# Patient Record
Sex: MALE | Race: OTHER | Employment: UNEMPLOYED | URBAN - METROPOLITAN AREA
[De-identification: names, ages, dates, MRNs, and addresses within clinical notes are randomized per-mention and may not be internally consistent; named-entity substitution may affect disease eponyms.]

---
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
## 2019-01-07 NOTE — TELEPHONE ENCOUNTER
Patient call  Trying  To updated  A accessnow but  Patient havent seen a doctor  Lately . I told patient to come to Sherwin Aleman for a follow up if he need it .     Marshall Becerra

## 2019-07-08 ENCOUNTER — TELEPHONE (OUTPATIENT)
Dept: FAMILY MEDICINE CLINIC | Age: 19
End: 2019-07-08

## 2019-07-08 NOTE — TELEPHONE ENCOUNTER
Faxed refill request for Furosemide 40 mg tab take 1/2 tab daily. Chart reviewed. LOV was 3/7/18 w/ provider Alley Miranda NP. Refill denied and faxed back to pharmacy w/ note that pt needs to return for f/u. Reached out to pt and phone answered by sister, Sylvia Zavala. Not on HIPA form. Cannot discuss issues as saiter is not on HIPA and just gave general information that any pt needs to return for  refills. Line starts prior to clinic start time.

## 2019-11-16 ENCOUNTER — OFFICE VISIT (OUTPATIENT)
Dept: FAMILY MEDICINE CLINIC | Age: 19
End: 2019-11-16

## 2019-11-16 VITALS
HEART RATE: 50 BPM | WEIGHT: 166.2 LBS | DIASTOLIC BLOOD PRESSURE: 63 MMHG | OXYGEN SATURATION: 99 % | TEMPERATURE: 98.9 F | HEIGHT: 64 IN | SYSTOLIC BLOOD PRESSURE: 115 MMHG | BODY MASS INDEX: 28.38 KG/M2

## 2019-11-16 DIAGNOSIS — N04.9 NEPHROTIC SYNDROME: Primary | ICD-10-CM

## 2019-11-16 DIAGNOSIS — Z71.89 COUNSELING AND COORDINATION OF CARE: Primary | ICD-10-CM

## 2019-11-16 DIAGNOSIS — Z23 ENCOUNTER FOR IMMUNIZATION: ICD-10-CM

## 2019-11-16 RX ORDER — FUROSEMIDE 40 MG/1
20 TABLET ORAL DAILY
Qty: 30 TAB | Refills: 0 | Status: SHIPPED | OUTPATIENT
Start: 2019-11-16

## 2019-11-16 NOTE — PROGRESS NOTES
Assessment/Plan:       ICD-10-CM ICD-9-CM    1. Nephrotic syndrome N04.9 581.9 furosemide (LASIX) 40 mg tablet      REFERRAL TO NEPHROLOGY   2. Encounter for immunization Z23 V03.89 Shannan Doll [address], [address] [facility] [address]  [address] [facility] [place] [address]  Phone: [phone] Fax: [phone]    Jenise Julio [id number] [facility]  [address]  [address] [facility] [address]  Phone: [phone] Fax: [phone]      [facility]  Subjective:     Chief Complaint   Patient presents with    Medication Refill     medication refill for renal insuffiency     Immunization/Injection    Saranya Nava is a 20 y.o. OTHER male. HPI:   He  has no past medical history of Abdominal colic, Anemia, Asthma, Autism, Community acquired pneumonia, Developmental delay, Irritable bowel syndrome, Murmur, Obesity, Otitis media, Overbite, Premature infant, Psychiatric problem, Reactive airway disease, STD (sexually transmitted disease), Thyroid activity decreased, or Vision decreased. Hasn't seen the kidney specialist in a while. Review of Systems: Negative for: fever, chest pain, shortness of breath, leg swelling, exertional dyspnea, palpitations. Current Medications:   No current outpatient medications on file prior to visit. No current facility-administered medications on file prior to visit. Social History: He  reports that he has never smoked. He has never used smokeless tobacco. He reports that he drinks alcohol. He reports that he does not use drugs. Objective:     Vitals:    11/16/19 1100   BP: 115/63   Pulse: (!) 50   Temp: 98.9 °F (37.2 °C)   TempSrc: Oral   SpO2: 99%   Weight: 166 lb 3.2 oz (75.4 kg)   Height: 5' 4.02\" (1.626 m)    No LMP for male patient.    Wt Readings from Last 2 Encounters:   11/16/19 166 lb 3.2 oz (75.4 kg)   03/07/18 207 lb (93.9 kg) (95 %, Z= 1.69)*     * Growth percentiles are based on [facility] (Boys, 2-20 Years) data. Physical Examination:  General appearance - well developed, no acute distress. Chest - clear to auscultation. Heart - regular rate and rhythm without murmurs, rubs, or gallops. Abdomen - bowel sounds present x 4, NT, ND. Extremities - pulses intact. No peripheral edema. Assessment/Plan:   Diagnoses and all orders for this visit:    1. Nephrotic syndrome  -     furosemide (LASIX) 40 mg tablet; Take 0.5 Tabs by mouth daily.  -     REFERRAL TO NEPHROLOGY; Future    2. Encounter for immunization  -     INFLUENZA VIRUS VAC QUAD,SPLIT,PRESV FREE SYRINGE IM        Zeeshan Heller, JAMES, FNP-BC, BC-ADM  [address] [place] [address] expressed understanding of this plan.

## 2019-11-16 NOTE — PROGRESS NOTES
Patient seen for discharge with assistance from [facility], registrar and . We reviewed the AVS, prescription, pharmacy location and the referral to AN for Nephrology The patient will go now to registration to schedule a financial screening appointment. Luca Martin RN       [facility] Samir Barry. Patient denied fever and tolerated vaccine well. Patient verbalized understanding of possible side effects from vaccine and when to seek emergency medical treatment. VIIS information sheet given to patient. Patient had no adverse reaction at time of discharge.

## 2019-11-18 ENCOUNTER — DOCUMENTATION ONLY (OUTPATIENT)
Dept: FAMILY MEDICINE CLINIC | Age: 19
End: 2019-11-18

## 2019-11-18 DIAGNOSIS — Z20.2 EXPOSURE TO CHLAMYDIA: Primary | ICD-10-CM

## 2019-11-18 DIAGNOSIS — N04.9 NEPHROTIC SYNDROME: ICD-10-CM

## 2019-11-18 NOTE — PROGRESS NOTES
Venu Quiroga will return for lab appointment on 11/20/19 at [facility] for the following:

## 2019-11-18 NOTE — PROGRESS NOTES
Telephone call to Danielle Flowers who is listed on Jack's permission to release all information form. Discussed that prior to giving him treatment, I wanted to measure his renal function. I also wanted to check him for gonorrhea and chlamydia. My plan is to treat with azithromycin 250 mg, #4 tab and send in to his pharmacy. His last renal function over a year ago showed normal GFR. Per UpToDate, if GFR is < 10 this should be \"used with caution\". There is no recommendation on a dosage adjustment, however. I was able to offer June Hopper a lab appointment for Wednesday at [facility], which Tila Puente put into [place]. No questions at close of call.   Karlie Bingham NP

## 2019-11-19 RX ORDER — AZITHROMYCIN 500 MG/1
1000 TABLET, FILM COATED ORAL DAILY
Qty: 2 TAB | Refills: 0 | Status: SHIPPED | OUTPATIENT
Start: 2019-11-22

## 2019-11-20 ENCOUNTER — HOSPITAL ENCOUNTER (OUTPATIENT)
Dept: LAB | Age: 19
Discharge: HOME | End: 2019-11-20

## 2019-11-20 ENCOUNTER — LAB ONLY (OUTPATIENT)
Dept: FAMILY MEDICINE CLINIC | Age: 19
End: 2019-11-20

## 2019-11-20 DIAGNOSIS — N04.9 NEPHROTIC SYNDROME: Primary | ICD-10-CM

## 2019-11-20 DIAGNOSIS — Z20.2 EXPOSURE TO CHLAMYDIA: ICD-10-CM

## 2019-11-20 DIAGNOSIS — N04.9 NEPHROTIC SYNDROME: ICD-10-CM

## 2019-11-20 LAB
ALBUMIN SERPL-MCNC: 3.3 G/DL (ref 3.5–5)
ALBUMIN/GLOB SERPL: 1.3 {RATIO} (ref 1.1–2.2)
ALP SERPL-CCNC: 88 U/L (ref 45–117)
ALT SERPL-CCNC: 22 U/L (ref 12–78)
ANION GAP SERPL CALC-SCNC: 4 MMOL/L (ref 5–15)
AST SERPL-CCNC: 11 U/L (ref 15–37)
BILIRUB SERPL-MCNC: 0.7 MG/DL (ref 0.2–1)
BUN SERPL-MCNC: 9 MG/DL (ref 6–20)
BUN/CREAT SERPL: 12 (ref 12–20)
CALCIUM SERPL-MCNC: 9.1 MG/DL (ref 8.5–10.1)
CHLORIDE SERPL-SCNC: 106 MMOL/L (ref 97–108)
CO2 SERPL-SCNC: 32 MMOL/L (ref 21–32)
CREAT SERPL-MCNC: 0.73 MG/DL (ref 0.7–1.3)
CREAT UR-MCNC: 160 MG/DL
GLOBULIN SER CALC-MCNC: 2.6 G/DL (ref 2–4)
GLUCOSE SERPL-MCNC: 99 MG/DL (ref 65–100)
MICROALBUMIN UR-MCNC: 125 MG/DL
MICROALBUMIN/CREAT UR-RTO: 781 MG/G (ref 0–30)
POTASSIUM SERPL-SCNC: 4.5 MMOL/L (ref 3.5–5.1)
PROT SERPL-MCNC: 5.9 G/DL (ref 6.4–8.2)
SODIUM SERPL-SCNC: 142 MMOL/L (ref 136–145)

## 2019-11-20 PROCEDURE — 87491 CHLMYD TRACH DNA AMP PROBE: CPT

## 2019-11-20 PROCEDURE — 80053 COMPREHEN METABOLIC PANEL: CPT

## 2019-11-20 PROCEDURE — 82043 UR ALBUMIN QUANTITATIVE: CPT

## 2019-11-20 NOTE — PROGRESS NOTES
Patient came in for a lab only visit per Luis Guerrero NP. CMP was drawn from [facility] without complications. A urin was collected for a microalbumin and Chlamydia. Results pending.

## 2019-11-20 NOTE — PROGRESS NOTES
Patient here for labs. I have sent in his prescription to his pharmacy. He may call us on Friday for his test results and permission to start the treatment. Diagnoses and all orders for this visit:    1. Nephrotic syndrome  -     MICROALBUMIN, UR, RAND W/ MICROALB/CREAT RATIO; Future  -     METABOLIC PANEL, COMPREHENSIVE; Future    2. Exposure to chlamydia  -     CHLAMYDIA/GC PCR; Future  -     azithromycin (ZITHROMAX) 500 mg tab; Take 2 Tabs by mouth daily.

## 2019-11-21 NOTE — PROGRESS NOTES
He may go to his pharmacy and  his azithromycin. There is a bit less protein in his urine than a year ago - this is good news.

## 2019-11-22 LAB
C TRACH DNA SPEC QL NAA+PROBE: NEGATIVE
N GONORRHOEA DNA SPEC QL NAA+PROBE: NEGATIVE
SAMPLE TYPE: NORMAL
SERVICE CMNT-IMP: NORMAL
SPECIMEN SOURCE: NORMAL